# Patient Record
Sex: FEMALE
[De-identification: names, ages, dates, MRNs, and addresses within clinical notes are randomized per-mention and may not be internally consistent; named-entity substitution may affect disease eponyms.]

---

## 2021-06-21 ENCOUNTER — HOSPITAL ENCOUNTER (OUTPATIENT)
Dept: HOSPITAL 90 - EDH | Age: 61
Setting detail: OBSERVATION
Discharge: HOME | End: 2021-06-21
Attending: SURGERY | Admitting: SURGERY
Payer: COMMERCIAL

## 2021-06-21 VITALS — DIASTOLIC BLOOD PRESSURE: 75 MMHG | SYSTOLIC BLOOD PRESSURE: 156 MMHG

## 2021-06-21 VITALS — SYSTOLIC BLOOD PRESSURE: 142 MMHG | DIASTOLIC BLOOD PRESSURE: 65 MMHG

## 2021-06-21 VITALS — SYSTOLIC BLOOD PRESSURE: 135 MMHG | DIASTOLIC BLOOD PRESSURE: 70 MMHG

## 2021-06-21 VITALS — HEIGHT: 62 IN | BODY MASS INDEX: 20.61 KG/M2 | WEIGHT: 111.99 LBS

## 2021-06-21 VITALS — DIASTOLIC BLOOD PRESSURE: 74 MMHG | SYSTOLIC BLOOD PRESSURE: 154 MMHG

## 2021-06-21 VITALS — SYSTOLIC BLOOD PRESSURE: 157 MMHG | DIASTOLIC BLOOD PRESSURE: 72 MMHG

## 2021-06-21 VITALS — DIASTOLIC BLOOD PRESSURE: 73 MMHG | SYSTOLIC BLOOD PRESSURE: 150 MMHG

## 2021-06-21 VITALS — SYSTOLIC BLOOD PRESSURE: 157 MMHG | DIASTOLIC BLOOD PRESSURE: 74 MMHG

## 2021-06-21 VITALS — DIASTOLIC BLOOD PRESSURE: 74 MMHG | SYSTOLIC BLOOD PRESSURE: 129 MMHG

## 2021-06-21 VITALS — DIASTOLIC BLOOD PRESSURE: 70 MMHG | SYSTOLIC BLOOD PRESSURE: 148 MMHG

## 2021-06-21 VITALS — SYSTOLIC BLOOD PRESSURE: 132 MMHG | DIASTOLIC BLOOD PRESSURE: 74 MMHG

## 2021-06-21 VITALS — DIASTOLIC BLOOD PRESSURE: 79 MMHG | SYSTOLIC BLOOD PRESSURE: 141 MMHG

## 2021-06-21 VITALS — SYSTOLIC BLOOD PRESSURE: 141 MMHG | DIASTOLIC BLOOD PRESSURE: 59 MMHG

## 2021-06-21 VITALS — SYSTOLIC BLOOD PRESSURE: 148 MMHG | DIASTOLIC BLOOD PRESSURE: 70 MMHG

## 2021-06-21 VITALS — SYSTOLIC BLOOD PRESSURE: 138 MMHG | DIASTOLIC BLOOD PRESSURE: 69 MMHG

## 2021-06-21 VITALS — DIASTOLIC BLOOD PRESSURE: 75 MMHG | SYSTOLIC BLOOD PRESSURE: 155 MMHG

## 2021-06-21 VITALS — SYSTOLIC BLOOD PRESSURE: 140 MMHG | DIASTOLIC BLOOD PRESSURE: 65 MMHG

## 2021-06-21 VITALS — SYSTOLIC BLOOD PRESSURE: 152 MMHG | DIASTOLIC BLOOD PRESSURE: 66 MMHG

## 2021-06-21 VITALS — SYSTOLIC BLOOD PRESSURE: 141 MMHG | DIASTOLIC BLOOD PRESSURE: 66 MMHG

## 2021-06-21 VITALS — SYSTOLIC BLOOD PRESSURE: 151 MMHG | DIASTOLIC BLOOD PRESSURE: 71 MMHG

## 2021-06-21 DIAGNOSIS — I10: ICD-10-CM

## 2021-06-21 DIAGNOSIS — Z79.4: ICD-10-CM

## 2021-06-21 DIAGNOSIS — K81.0: Primary | ICD-10-CM

## 2021-06-21 DIAGNOSIS — E11.9: ICD-10-CM

## 2021-06-21 DIAGNOSIS — R11.2: ICD-10-CM

## 2021-06-21 LAB
ALBUMIN SERPL-MCNC: 3.3 G/DL (ref 3.5–5)
ALT SERPL-CCNC: 31 U/L (ref 12–78)
AMYLASE SERPL-CCNC: 116 U/L (ref 25–115)
APPEARANCE UR: CLEAR
AST SERPL-CCNC: 29 U/L (ref 10–37)
BASOPHILS NFR BLD AUTO: 0.7 % (ref 0–5)
BILIRUB SERPL-MCNC: 0.2 MG/DL (ref 0.2–1)
BILIRUB UR QL STRIP: NEGATIVE
BUN SERPL-MCNC: 13 MG/DL (ref 7–18)
CHLORIDE SERPL-SCNC: 102 MMOL/L (ref 101–111)
CO2 SERPL-SCNC: 29 MMOL/L (ref 21–32)
COLOR UR: YELLOW
CREAT SERPL-MCNC: 0.6 MG/DL (ref 0.5–1.5)
EOSINOPHIL NFR BLD AUTO: 3.8 % (ref 0–8)
ERYTHROCYTE [DISTWIDTH] IN BLOOD BY AUTOMATED COUNT: 13.1 % (ref 11–15.5)
GFR SERPL CREATININE-BSD FRML MDRD: 108 ML/MIN (ref 60–?)
GLUCOSE SERPL-MCNC: 186 MG/DL (ref 70–105)
GLUCOSE UR STRIP-MCNC: 100 MG/DL
HCT VFR BLD AUTO: 39.4 % (ref 36–48)
HGB UR QL STRIP: NEGATIVE
KETONES UR STRIP-MCNC: NEGATIVE MG/DL
LEUKOCYTE ESTERASE UR QL STRIP: (no result)
LIPASE SERPL-CCNC: 288 U/L (ref 114–286)
LYMPHOCYTES NFR SPEC AUTO: 25.3 % (ref 21–51)
MCH RBC QN AUTO: 31 PG (ref 27–33)
MCHC RBC AUTO-ENTMCNC: 32 G/DL (ref 32–36)
MCV RBC AUTO: 97 FL (ref 79–99)
MONOCYTES NFR BLD AUTO: 7.6 % (ref 3–13)
NEUTROPHILS NFR BLD AUTO: 62.3 % (ref 40–77)
NITRITE UR QL STRIP: NEGATIVE
NRBC BLD MANUAL-RTO: 0 % (ref 0–0.19)
PH UR STRIP: 5 [PH] (ref 5–8)
PLATELET # BLD AUTO: 245 K/UL (ref 130–400)
POTASSIUM SERPL-SCNC: 4 MMOL/L (ref 3.5–5.1)
PROT SERPL-MCNC: 6.6 G/DL (ref 6–8.3)
PROT UR QL STRIP: NEGATIVE MG/DL
RBC # BLD AUTO: 4.06 MIL/UL (ref 4–5.5)
RBC #/AREA URNS HPF: (no result) /HPF (ref 0–1)
SODIUM SERPL-SCNC: 140 MMOL/L (ref 136–145)
SP GR UR STRIP: 1.02 (ref 1–1.03)
UROBILINOGEN UR STRIP-MCNC: 0.2 MG/DL (ref 0.2–1)
WBC # BLD AUTO: 6.9 K/UL (ref 4.8–10.8)
WBC #/AREA URNS HPF: (no result) /HPF (ref 0–1)

## 2021-06-21 PROCEDURE — 83690 ASSAY OF LIPASE: CPT

## 2021-06-21 PROCEDURE — 81001 URINALYSIS AUTO W/SCOPE: CPT

## 2021-06-21 PROCEDURE — 96374 THER/PROPH/DIAG INJ IV PUSH: CPT

## 2021-06-21 PROCEDURE — 80053 COMPREHEN METABOLIC PANEL: CPT

## 2021-06-21 PROCEDURE — 47562 LAPAROSCOPIC CHOLECYSTECTOMY: CPT

## 2021-06-21 PROCEDURE — 82150 ASSAY OF AMYLASE: CPT

## 2021-06-21 PROCEDURE — 93005 ELECTROCARDIOGRAM TRACING: CPT

## 2021-06-21 PROCEDURE — 71046 X-RAY EXAM CHEST 2 VIEWS: CPT

## 2021-06-21 PROCEDURE — 82948 REAGENT STRIP/BLOOD GLUCOSE: CPT

## 2021-06-21 PROCEDURE — 36415 COLL VENOUS BLD VENIPUNCTURE: CPT

## 2021-06-21 PROCEDURE — 99285 EMERGENCY DEPT VISIT HI MDM: CPT

## 2021-06-21 PROCEDURE — 96361 HYDRATE IV INFUSION ADD-ON: CPT

## 2021-06-21 PROCEDURE — 85025 COMPLETE CBC W/AUTO DIFF WBC: CPT

## 2021-06-21 RX ADMIN — CEFOXITIN SCH GM: 1 INJECTION, POWDER, FOR SOLUTION INTRAVENOUS at 08:45

## 2025-01-24 ENCOUNTER — HOSPITAL ENCOUNTER (OUTPATIENT)
Dept: HOSPITAL 90 - RAH | Age: 65
Discharge: HOME | End: 2025-01-24
Attending: INTERNAL MEDICINE
Payer: COMMERCIAL

## 2025-01-24 DIAGNOSIS — I83.893: Primary | ICD-10-CM

## 2025-01-24 DIAGNOSIS — L98.491: ICD-10-CM

## 2025-01-24 DIAGNOSIS — E11.51: ICD-10-CM

## 2025-01-24 DIAGNOSIS — I70.90: ICD-10-CM

## 2025-01-24 PROCEDURE — 93970 EXTREMITY STUDY: CPT

## 2025-01-24 PROCEDURE — 93925 LOWER EXTREMITY STUDY: CPT

## 2025-01-24 NOTE — [facility]
US VENOUS DOPPLER BILATERAL



HISTORY: Varicose veins



COMPARISON: None



TECHNIQUE: Bilateral lower extremity venous Doppler ultrasound study was

performed.



FINDINGS: The common femoral, femoral, popliteal, and posterior tibial

veins are visualized.  Normal flow with augmentation and

compressibilities are demonstrated.  The greater saphenous veins are

also seen and grossly patent. 



IMPRESSION: 

1. No evidence of deep venous thrombosis is seen.

## 2025-01-24 NOTE — [facility]
US ARTERIAL BILAT LOW EXT DUPL



HISTORY: No additional history given.



COMPARISON: None



TECHNIQUE: Bilateral  lower extremity arterial Doppler ultrasound study

was performed.



FINDINGS:  Normal triphasic and biphasic arterial waveforms are noted in

the common femoral, deep femoral, superficial femoral, popliteal, left

anterior tibial, posterior tibial and dorsalis pedal arteries. Abnormal

monophasic arterial waveforms are seen in the right distal popliteal

artery, right anterior tibial, posterior tibial and dorsalis pedal

arteries.



On the right, the peak systolic velocity of the common femoral artery is

 150 cm/s, the proximal femoral artery is  80 cm/s, the mid femoral

artery is  98 cm/s, the distal femoral artery is  95 cm/s, the proximal

popliteal artery is  89 cm/s, the distal popliteal artery is  115 cm/s,

the anterior tibial artery is  78 cm/s,  the posterior tibial artery

artery is  94 cm/s,and the dorsalis pedal artery is  78 cm/s.



On the left, the peak systolic velocity of the common femoral artery is 

232 cm/s, the proximal femoral artery is  108 cm/s, the mid femoral

artery is  98 cm/s, the distal femoral artery is  81 cm/s, the proximal

popliteal artery is  78 cm/s, the distal popliteal artery is  103 cm/s,

the anterior tibial artery is  61 cm/s,  the posterior tibial artery

artery is  61 cm/s,and the dorsalis pedal artery is  42 cm/s.



IMPRESSION: 

1. Atherosclerotic disease.

2. Otherwise normal triphasic and biphasic arterial waveforms noted of

the lower extremity artery system.